# Patient Record
Sex: FEMALE | Race: WHITE | URBAN - METROPOLITAN AREA
[De-identification: names, ages, dates, MRNs, and addresses within clinical notes are randomized per-mention and may not be internally consistent; named-entity substitution may affect disease eponyms.]

---

## 2023-06-02 LAB
C. TRACHOMATIS, EXTERNAL RESULT: NEGATIVE
N. GONORRHOEAE, EXTERNAL RESULT: NEGATIVE

## 2023-06-28 LAB
ABO, EXTERNAL RESULT: NORMAL
HEP B, EXTERNAL RESULT: NEGATIVE
HEPATITIS C ANTIBODY, EXTERNAL RESULT: NEGATIVE
HIV, EXTERNAL RESULT: NEGATIVE
RH FACTOR, EXTERNAL RESULT: POSITIVE
RUBELLA TITER, EXTERNAL RESULT: NORMAL

## 2024-01-12 LAB — GBS, EXTERNAL RESULT: NEGATIVE

## 2024-02-04 ENCOUNTER — HOSPITAL ENCOUNTER (INPATIENT)
Age: 29
LOS: 2 days | Discharge: HOME | End: 2024-02-07
Attending: OBSTETRICS & GYNECOLOGY | Admitting: OBSTETRICS & GYNECOLOGY
Payer: COMMERCIAL

## 2024-02-05 ENCOUNTER — ANESTHESIA (OUTPATIENT)
Dept: LABOR AND DELIVERY | Age: 29
End: 2024-02-05
Payer: COMMERCIAL

## 2024-02-05 ENCOUNTER — ANESTHESIA EVENT (OUTPATIENT)
Dept: LABOR AND DELIVERY | Age: 29
End: 2024-02-05
Payer: COMMERCIAL

## 2024-02-05 PROBLEM — Z37.9 NORMAL LABOR: Status: ACTIVE | Noted: 2024-02-05

## 2024-02-05 LAB
ABO + RH BLD: NORMAL
AMPHETAMINES UR QL SCN>1000 NG/ML: NORMAL
BARBITURATES UR QL SCN>200 NG/ML: NORMAL
BENZODIAZ UR QL SCN>200 NG/ML: NORMAL
BLD GP AB SCN SERPL QL: NORMAL
BUPRENORPHINE+NOR UR QL SCN: NORMAL
CANNABINOIDS UR QL SCN>50 NG/ML: NORMAL
COCAINE UR QL SCN: NORMAL
DEPRECATED RDW RBC AUTO: 13.4 % (ref 12.4–15.4)
DRUG SCREEN COMMENT UR-IMP: NORMAL
FENTANYL SCREEN, URINE: NORMAL
HCT VFR BLD AUTO: 42.3 % (ref 36–48)
HGB BLD-MCNC: 14.3 G/DL (ref 12–16)
MCH RBC QN AUTO: 30 PG (ref 26–34)
MCHC RBC AUTO-ENTMCNC: 33.8 G/DL (ref 31–36)
MCV RBC AUTO: 88.7 FL (ref 80–100)
METHADONE UR QL SCN>300 NG/ML: NORMAL
OPIATES UR QL SCN>300 NG/ML: NORMAL
OXYCODONE UR QL SCN: NORMAL
PCP UR QL SCN>25 NG/ML: NORMAL
PH UR STRIP: 5 [PH]
PLATELET # BLD AUTO: 212 K/UL (ref 135–450)
PMV BLD AUTO: 9.9 FL (ref 5–10.5)
RBC # BLD AUTO: 4.77 M/UL (ref 4–5.2)
REAGIN+T PALLIDUM IGG+IGM SERPL-IMP: NORMAL
WBC # BLD AUTO: 14.2 K/UL (ref 4–11)

## 2024-02-05 PROCEDURE — 7200000001 HC VAGINAL DELIVERY

## 2024-02-05 PROCEDURE — 10907ZC DRAINAGE OF AMNIOTIC FLUID, THERAPEUTIC FROM PRODUCTS OF CONCEPTION, VIA NATURAL OR ARTIFICIAL OPENING: ICD-10-PCS | Performed by: OBSTETRICS & GYNECOLOGY

## 2024-02-05 PROCEDURE — 80307 DRUG TEST PRSMV CHEM ANLYZR: CPT

## 2024-02-05 PROCEDURE — 85027 COMPLETE CBC AUTOMATED: CPT

## 2024-02-05 PROCEDURE — 51702 INSERT TEMP BLADDER CATH: CPT

## 2024-02-05 PROCEDURE — 1220000000 HC SEMI PRIVATE OB R&B

## 2024-02-05 PROCEDURE — 6360000002 HC RX W HCPCS: Performed by: ADVANCED PRACTICE MIDWIFE

## 2024-02-05 PROCEDURE — 2580000003 HC RX 258

## 2024-02-05 PROCEDURE — 86900 BLOOD TYPING SEROLOGIC ABO: CPT

## 2024-02-05 PROCEDURE — 3700000025 EPIDURAL BLOCK: Performed by: ANESTHESIOLOGY

## 2024-02-05 PROCEDURE — 6360000002 HC RX W HCPCS: Performed by: REGISTERED NURSE

## 2024-02-05 PROCEDURE — 6370000000 HC RX 637 (ALT 250 FOR IP)

## 2024-02-05 PROCEDURE — 86850 RBC ANTIBODY SCREEN: CPT

## 2024-02-05 PROCEDURE — 2580000003 HC RX 258: Performed by: ADVANCED PRACTICE MIDWIFE

## 2024-02-05 PROCEDURE — 86901 BLOOD TYPING SEROLOGIC RH(D): CPT

## 2024-02-05 PROCEDURE — 86780 TREPONEMA PALLIDUM: CPT

## 2024-02-05 PROCEDURE — 2500000003 HC RX 250 WO HCPCS: Performed by: REGISTERED NURSE

## 2024-02-05 RX ORDER — CLINDAMYCIN PHOSPHATE 900 MG/50ML
900 INJECTION, SOLUTION INTRAVENOUS EVERY 8 HOURS
Status: DISCONTINUED | OUTPATIENT
Start: 2024-02-05 | End: 2024-02-05

## 2024-02-05 RX ORDER — FAMOTIDINE 10 MG/ML
20 INJECTION, SOLUTION INTRAVENOUS 2 TIMES DAILY
Status: DISCONTINUED | OUTPATIENT
Start: 2024-02-05 | End: 2024-02-05

## 2024-02-05 RX ORDER — LIDOCAINE HYDROCHLORIDE AND EPINEPHRINE BITARTRATE 20; .01 MG/ML; MG/ML
INJECTION, SOLUTION SUBCUTANEOUS PRN
Status: DISCONTINUED | OUTPATIENT
Start: 2024-02-05 | End: 2024-02-05 | Stop reason: SDUPTHER

## 2024-02-05 RX ORDER — SODIUM CHLORIDE 9 MG/ML
INJECTION, SOLUTION INTRAVENOUS PRN
Status: DISCONTINUED | OUTPATIENT
Start: 2024-02-05 | End: 2024-02-07 | Stop reason: HOSPADM

## 2024-02-05 RX ORDER — BUPIVACAINE HYDROCHLORIDE 5 MG/ML
INJECTION, SOLUTION EPIDURAL; INTRACAUDAL PRN
Status: DISCONTINUED | OUTPATIENT
Start: 2024-02-05 | End: 2024-02-05 | Stop reason: SDUPTHER

## 2024-02-05 RX ORDER — SODIUM CHLORIDE 0.9 % (FLUSH) 0.9 %
5-40 SYRINGE (ML) INJECTION PRN
Status: DISCONTINUED | OUTPATIENT
Start: 2024-02-05 | End: 2024-02-05

## 2024-02-05 RX ORDER — FERROUS SULFATE 325(65) MG
325 TABLET ORAL EVERY OTHER DAY
Status: DISCONTINUED | OUTPATIENT
Start: 2024-02-05 | End: 2024-02-07 | Stop reason: HOSPADM

## 2024-02-05 RX ORDER — SODIUM CHLORIDE, SODIUM LACTATE, POTASSIUM CHLORIDE, AND CALCIUM CHLORIDE .6; .31; .03; .02 G/100ML; G/100ML; G/100ML; G/100ML
500 INJECTION, SOLUTION INTRAVENOUS PRN
Status: DISCONTINUED | OUTPATIENT
Start: 2024-02-05 | End: 2024-02-05

## 2024-02-05 RX ORDER — MISOPROSTOL 100 UG/1
800 TABLET ORAL PRN
Status: DISCONTINUED | OUTPATIENT
Start: 2024-02-05 | End: 2024-02-05

## 2024-02-05 RX ORDER — SODIUM CHLORIDE, SODIUM LACTATE, POTASSIUM CHLORIDE, CALCIUM CHLORIDE 600; 310; 30; 20 MG/100ML; MG/100ML; MG/100ML; MG/100ML
INJECTION, SOLUTION INTRAVENOUS CONTINUOUS
Status: DISCONTINUED | OUTPATIENT
Start: 2024-02-05 | End: 2024-02-05

## 2024-02-05 RX ORDER — DOCUSATE SODIUM 100 MG/1
100 CAPSULE, LIQUID FILLED ORAL 2 TIMES DAILY
Status: DISCONTINUED | OUTPATIENT
Start: 2024-02-05 | End: 2024-02-07 | Stop reason: HOSPADM

## 2024-02-05 RX ORDER — CARBOPROST TROMETHAMINE 250 UG/ML
250 INJECTION, SOLUTION INTRAMUSCULAR PRN
Status: DISCONTINUED | OUTPATIENT
Start: 2024-02-05 | End: 2024-02-05

## 2024-02-05 RX ORDER — SODIUM CHLORIDE 0.9 % (FLUSH) 0.9 %
5-40 SYRINGE (ML) INJECTION EVERY 12 HOURS SCHEDULED
Status: DISCONTINUED | OUTPATIENT
Start: 2024-02-05 | End: 2024-02-07 | Stop reason: HOSPADM

## 2024-02-05 RX ORDER — LANOLIN 100 %
OINTMENT (GRAM) TOPICAL PRN
Status: DISCONTINUED | OUTPATIENT
Start: 2024-02-05 | End: 2024-02-07 | Stop reason: HOSPADM

## 2024-02-05 RX ORDER — SODIUM CHLORIDE 0.9 % (FLUSH) 0.9 %
5-40 SYRINGE (ML) INJECTION EVERY 12 HOURS SCHEDULED
Status: DISCONTINUED | OUTPATIENT
Start: 2024-02-05 | End: 2024-02-05

## 2024-02-05 RX ORDER — ONDANSETRON 2 MG/ML
4 INJECTION INTRAMUSCULAR; INTRAVENOUS EVERY 6 HOURS PRN
Status: DISCONTINUED | OUTPATIENT
Start: 2024-02-05 | End: 2024-02-05

## 2024-02-05 RX ORDER — NALBUPHINE HYDROCHLORIDE 20 MG/ML
10 INJECTION, SOLUTION INTRAMUSCULAR; INTRAVENOUS; SUBCUTANEOUS
Status: DISCONTINUED | OUTPATIENT
Start: 2024-02-05 | End: 2024-02-05

## 2024-02-05 RX ORDER — ACETAMINOPHEN 500 MG
1000 TABLET ORAL EVERY 8 HOURS PRN
Status: DISCONTINUED | OUTPATIENT
Start: 2024-02-05 | End: 2024-02-07 | Stop reason: HOSPADM

## 2024-02-05 RX ORDER — SODIUM CHLORIDE 9 MG/ML
25 INJECTION, SOLUTION INTRAVENOUS PRN
Status: DISCONTINUED | OUTPATIENT
Start: 2024-02-05 | End: 2024-02-05

## 2024-02-05 RX ORDER — TERBUTALINE SULFATE 1 MG/ML
0.25 INJECTION, SOLUTION SUBCUTANEOUS
Status: DISCONTINUED | OUTPATIENT
Start: 2024-02-05 | End: 2024-02-05

## 2024-02-05 RX ORDER — METHYLERGONOVINE MALEATE 0.2 MG/ML
200 INJECTION INTRAVENOUS PRN
Status: DISCONTINUED | OUTPATIENT
Start: 2024-02-05 | End: 2024-02-05

## 2024-02-05 RX ORDER — IBUPROFEN 800 MG/1
800 TABLET ORAL EVERY 8 HOURS PRN
Status: DISCONTINUED | OUTPATIENT
Start: 2024-02-05 | End: 2024-02-07 | Stop reason: HOSPADM

## 2024-02-05 RX ORDER — DOCUSATE SODIUM 100 MG/1
100 CAPSULE, LIQUID FILLED ORAL 2 TIMES DAILY
Status: DISCONTINUED | OUTPATIENT
Start: 2024-02-05 | End: 2024-02-05

## 2024-02-05 RX ORDER — SODIUM CHLORIDE 0.9 % (FLUSH) 0.9 %
5-40 SYRINGE (ML) INJECTION PRN
Status: DISCONTINUED | OUTPATIENT
Start: 2024-02-05 | End: 2024-02-07 | Stop reason: HOSPADM

## 2024-02-05 RX ORDER — SODIUM CHLORIDE, SODIUM LACTATE, POTASSIUM CHLORIDE, AND CALCIUM CHLORIDE .6; .31; .03; .02 G/100ML; G/100ML; G/100ML; G/100ML
1000 INJECTION, SOLUTION INTRAVENOUS PRN
Status: DISCONTINUED | OUTPATIENT
Start: 2024-02-05 | End: 2024-02-05

## 2024-02-05 RX ADMIN — Medication 87.3 MILLI-UNITS/MIN: at 13:03

## 2024-02-05 RX ADMIN — Medication 1 MILLI-UNITS/MIN: at 05:02

## 2024-02-05 RX ADMIN — Medication 10 ML: at 22:11

## 2024-02-05 RX ADMIN — Medication 15 ML/HR: at 05:56

## 2024-02-05 RX ADMIN — SODIUM CHLORIDE, POTASSIUM CHLORIDE, SODIUM LACTATE AND CALCIUM CHLORIDE: 600; 310; 30; 20 INJECTION, SOLUTION INTRAVENOUS at 06:01

## 2024-02-05 RX ADMIN — IBUPROFEN 800 MG: 800 TABLET, FILM COATED ORAL at 22:35

## 2024-02-05 RX ADMIN — LIDOCAINE HYDROCHLORIDE AND EPINEPHRINE 5 ML: 20; 10 INJECTION, SOLUTION INFILTRATION; PERINEURAL at 05:46

## 2024-02-05 RX ADMIN — SODIUM CHLORIDE, POTASSIUM CHLORIDE, SODIUM LACTATE AND CALCIUM CHLORIDE 1000 ML: 600; 310; 30; 20 INJECTION, SOLUTION INTRAVENOUS at 05:04

## 2024-02-05 RX ADMIN — SODIUM CHLORIDE, POTASSIUM CHLORIDE, SODIUM LACTATE AND CALCIUM CHLORIDE: 600; 310; 30; 20 INJECTION, SOLUTION INTRAVENOUS at 08:16

## 2024-02-05 RX ADMIN — BUPIVACAINE HYDROCHLORIDE 5 ML: 5 INJECTION, SOLUTION EPIDURAL; INTRACAUDAL; PERINEURAL at 05:50

## 2024-02-05 RX ADMIN — IBUPROFEN 800 MG: 800 TABLET, FILM COATED ORAL at 14:57

## 2024-02-05 RX ADMIN — BENZOCAINE AND LEVOMENTHOL: 200; 5 SPRAY TOPICAL at 14:57

## 2024-02-05 RX ADMIN — BUPIVACAINE HYDROCHLORIDE 3 ML: 5 INJECTION, SOLUTION EPIDURAL; INTRACAUDAL; PERINEURAL at 05:54

## 2024-02-05 RX ADMIN — Medication 166.7 ML: at 12:44

## 2024-02-05 RX ADMIN — DOCUSATE SODIUM 100 MG: 100 CAPSULE, LIQUID FILLED ORAL at 22:10

## 2024-02-05 RX ADMIN — ACETAMINOPHEN 1000 MG: 500 TABLET ORAL at 18:19

## 2024-02-05 RX ADMIN — NALBUPHINE HYDROCHLORIDE 10 MG: 20 INJECTION, SOLUTION INTRAMUSCULAR; INTRAVENOUS; SUBCUTANEOUS at 01:24

## 2024-02-05 RX ADMIN — WITCH HAZEL: 500 SOLUTION RECTAL; TOPICAL at 14:57

## 2024-02-05 ASSESSMENT — PAIN DESCRIPTION - LOCATION
LOCATION: ABDOMEN
LOCATION: BACK
LOCATION: BACK

## 2024-02-05 ASSESSMENT — PAIN DESCRIPTION - DESCRIPTORS
DESCRIPTORS: ACHING
DESCRIPTORS: ACHING;DISCOMFORT
DESCRIPTORS: CRAMPING

## 2024-02-05 ASSESSMENT — PAIN - FUNCTIONAL ASSESSMENT
PAIN_FUNCTIONAL_ASSESSMENT: ACTIVITIES ARE NOT PREVENTED
PAIN_FUNCTIONAL_ASSESSMENT: ACTIVITIES ARE NOT PREVENTED

## 2024-02-05 ASSESSMENT — PAIN SCALES - GENERAL
PAINLEVEL_OUTOF10: 3
PAINLEVEL_OUTOF10: 2
PAINLEVEL_OUTOF10: 3
PAINLEVEL_OUTOF10: 5

## 2024-02-05 ASSESSMENT — PAIN DESCRIPTION - ORIENTATION: ORIENTATION: LOWER;MID

## 2024-02-05 NOTE — ANESTHESIA PRE PROCEDURE
Department of Anesthesiology  Preprocedure Note       Name:  Roberta Santos   Age:  28 y.o.  :  1995                                          MRN:  [id number]         Date:  2024      Surgeon: * No surgeons listed *    Procedure: * No procedures listed *    Medications prior to admission:   Prior to Admission medications    Medication Sig Start Date End Date Taking? Authorizing Provider   Prenatal MV-Min-Fe Fum-FA-DHA (PRENATAL 1 PO) Take by mouth   Yes Provider, MD Ludivina       Current medications:    Current Facility-Administered Medications   Medication Dose Route Frequency Provider Last Rate Last Admin    lactated ringers IV soln infusion   IntraVENous Continuous Pham No APRN - VIVI        lactated ringers bolus bolus 500 mL  500 mL IntraVENous PRN Pham No APRN - CNM        Or    lactated ringers bolus bolus 1,000 mL  1,000 mL IntraVENous PRN Pham No APRN - VIVI 999 mL/hr at 24 0504 1,000 mL at 24 0504    sodium chloride flush 0.9 % injection 5-40 mL  5-40 mL IntraVENous 2 times per day Pham No APRN - VIVI        sodium chloride flush 0.9 % injection 5-40 mL  5-40 mL IntraVENous PRN Pham No APRN - CNDELISA        0.9 % sodium chloride infusion  25 mL IntraVENous PRN Pham No APRN - CNDELISA        methylergonovine (METHERGINE) injection 200 mcg  200 mcg IntraMUSCular PRN Pham No APRN - VIVI        carboprost (HEMABATE) injection 250 mcg  250 mcg IntraMUSCular PRN Pham No APRN - CNDELISA        miSOPROStol (CYTOTEC) tablet 800 mcg  800 mcg Rectal PRN Pham No APRN - VIVI        tranexamic acid (CYKLOKAPRON) 1,000 mg in sodium chloride 0.9 % 100 mL IVPB  1,000 mg IntraVENous Once PRN Pham No APRN - VIVI        oxytocin (PITOCIN) 30 units in 500 mL infusion  87.3 maximiliano-units/min IntraVENous Continuous PRN Pham No APRN - CNM        And    oxytocin

## 2024-02-05 NOTE — H&P
Department of Obstetrics and Gynecology   Obstetrics History and Physical        CHIEF COMPLAINT:  contractions    HISTORY OF PRESENT ILLNESS:      The patient is a 28 y.o. female at 40w3d.  OB History          1    Para   0    Term   0       0    AB   0    Living   0         SAB   0    IAB   0    Ectopic   0    Molar   0    Multiple   0    Live Births   0            Patient presents with a chief complaint as above and is being admitted for latent labor. Was FT/20/-3 in office on 24 and is now 4/70/-2. Started having contractions last night that she was able to sleep through and they have gotten progressively worse this evening. Denies decreased fetal movement, LOF, sekou red bleeding. Desires epidural and plans to breastfeed.    Estimated Due Date: Estimated Date of Delivery: 24    PRENATAL CARE: normal level 1 sono    Complicated by: none    PAST OB HISTORY:  OB History          1    Para   0    Term   0       0    AB   0    Living   0         SAB   0    IAB   0    Ectopic   0    Molar   0    Multiple   0    Live Births   0                Past Medical History:    History reviewed. No pertinent past medical history.  Past Surgical History:        Procedure Laterality Date    WISDOM TOOTH EXTRACTION       Allergies:  Penicillins    Social History:    Social History     Socioeconomic History    Marital status:      Spouse name: Not on file    Number of children: Not on file    Years of education: Not on file    Highest education level: Not on file   Occupational History    Not on file   Tobacco Use    Smoking status: Never    Smokeless tobacco: Never   Vaping Use    Vaping Use: Never used   Substance and Sexual Activity    Alcohol use: Never    Drug use: Never    Sexual activity: Yes     Partners: Male   Other Topics Concern    Not on file   Social History Narrative    Not on file     Social Determinants of Health     Financial Resource Strain: Not on file   Food Insecurity:

## 2024-02-05 NOTE — PROCEDURES
Department of Obstetrics and Gynecology  Spontaneous Vaginal Delivery Note    Labor & Delivery Summary  Labor Onset Date: 24  Labor Onset Time: 09  Dilation Complete Date: 24  Dilation Complete Time: 1115    Pre-operative Diagnosis:  Term pregnancy, Spontaneous labor, Single fetus, and Uncomplicated pregnancy    Post-operative Diagnosis:  Living  infant(s) and Male    Procedure:  Spontaneous vaginal delivery and midline episiotomy and repair    Provider:   DALE Fitzgerald CNM    Information for the patient's :  Agustín Santos [[id number]]        Anesthesia:  epidural anesthesia    Estimated blood loss:  250 ml    Specimen:  Placenta not sent to pathology     Cord blood sent No    Complications:  none    Condition:  infant stable and mother stable    Details of Procedure:   The patient is a 28 y.o. female at 40w3d   OB History          1    Para   0    Term   0       0    AB   0    Living   0         SAB   0    IAB   0    Ectopic   0    Molar   0    Multiple   0    Live Births   0            who was admitted for latent labor. She received the following interventions: ARBOW and IV Pitocin augmentation. She was known to be GBS negative and did not receive antibiotic prophylaxis. The patient progressed well, did receive an epidural, became complete and started to push. After pushing for 13 minutes, the fetal head was at the perineum, a midline episiotomy was done for fetal bradycardia despite position changes and lack of adequate maternal pushing effort. The rest of the infant delivered atraumatically and was placed on the mother's abdomen. The cord was clamped and cut and infant was evaluated by the SCN on the mother's abdomen. The infant was placed skin to skin with the mother. The delivery of the placenta was spontaneous. The perineum and vagina were explored. A midline second degree episiotomy was repaired in standard fashion w/ a 3.0 vicryl. Apgars were 8 and 9 at 1 and 5

## 2024-02-05 NOTE — ANESTHESIA PROCEDURE NOTES
Epidural Block    Patient location during procedure: OB  Start time: 2/5/2024 5:43 AM  End time: 2/5/2024 5:48 AM  Reason for block: labor epidural  Staffing  Performed: resident/CRNA   Resident/CRNA: Valencia Muñoz APRN - CRNA  Performed by: Valencia Muñoz APRN - CRNA  Authorized by: Johnny Scherer MD    Epidural  Patient position: sitting  Prep: ChloraPrep  Patient monitoring: continuous pulse ox and frequent blood pressure checks  Approach: midline  Location: L4-5  Injection technique: DWAYNE saline  Provider prep: mask and sterile gloves  Needle  Needle type: Tuohy   Needle gauge: 17 G  Needle length: 3.5 in  Needle insertion depth: 5 cm  Catheter type: multi-orifice  Catheter at skin depth: 9 cm  Test dose: negativeCatheter Secured: tegaderm and tape  Assessment  Sensory level: T6  Hemodynamics: stable  Attempts: 1  Outcomes: uncomplicated and patient tolerated procedure well  Preanesthetic Checklist  Completed: patient identified, IV checked, site marked, risks and benefits discussed, surgical/procedural consents, equipment checked, pre-op evaluation, timeout performed, anesthesia consent given, oxygen available, monitors applied/VS acknowledged, fire risk safety assessment completed and verbalized and blood product R/B/A discussed and consented

## 2024-02-06 PROBLEM — Z37.9 NORMAL LABOR: Status: RESOLVED | Noted: 2024-02-05 | Resolved: 2024-02-06

## 2024-02-06 PROCEDURE — 1220000000 HC SEMI PRIVATE OB R&B

## 2024-02-06 PROCEDURE — 6370000000 HC RX 637 (ALT 250 FOR IP)

## 2024-02-06 RX ADMIN — ACETAMINOPHEN 1000 MG: 500 TABLET ORAL at 18:48

## 2024-02-06 RX ADMIN — IBUPROFEN 800 MG: 800 TABLET, FILM COATED ORAL at 06:40

## 2024-02-06 RX ADMIN — IBUPROFEN 800 MG: 800 TABLET, FILM COATED ORAL at 23:03

## 2024-02-06 RX ADMIN — ACETAMINOPHEN 1000 MG: 500 TABLET ORAL at 02:06

## 2024-02-06 RX ADMIN — ACETAMINOPHEN 1000 MG: 500 TABLET ORAL at 10:29

## 2024-02-06 RX ADMIN — DOCUSATE SODIUM 100 MG: 100 CAPSULE, LIQUID FILLED ORAL at 10:30

## 2024-02-06 RX ADMIN — IBUPROFEN 800 MG: 800 TABLET, FILM COATED ORAL at 14:34

## 2024-02-06 ASSESSMENT — PAIN SCALES - GENERAL
PAINLEVEL_OUTOF10: 3
PAINLEVEL_OUTOF10: 3
PAINLEVEL_OUTOF10: 2
PAINLEVEL_OUTOF10: 2
PAINLEVEL_OUTOF10: 3
PAINLEVEL_OUTOF10: 2

## 2024-02-06 ASSESSMENT — PAIN DESCRIPTION - LOCATION
LOCATION: BACK
LOCATION: ABDOMEN;BUTTOCKS
LOCATION: ABDOMEN
LOCATION: ABDOMEN;PERINEUM
LOCATION: ABDOMEN;PERINEUM

## 2024-02-06 ASSESSMENT — PAIN - FUNCTIONAL ASSESSMENT
PAIN_FUNCTIONAL_ASSESSMENT: ACTIVITIES ARE NOT PREVENTED

## 2024-02-06 ASSESSMENT — PAIN DESCRIPTION - DESCRIPTORS
DESCRIPTORS: ACHING;CRAMPING
DESCRIPTORS: ACHING
DESCRIPTORS: ACHING;CRAMPING
DESCRIPTORS: SORE
DESCRIPTORS: ACHING

## 2024-02-06 ASSESSMENT — PAIN DESCRIPTION - ORIENTATION: ORIENTATION: LOWER

## 2024-02-07 VITALS
BODY MASS INDEX: 31.01 KG/M2 | DIASTOLIC BLOOD PRESSURE: 58 MMHG | OXYGEN SATURATION: 99 % | RESPIRATION RATE: 16 BRPM | SYSTOLIC BLOOD PRESSURE: 118 MMHG | HEIGHT: 63 IN | TEMPERATURE: 97.7 F | WEIGHT: 175 LBS | HEART RATE: 70 BPM

## 2024-02-07 PROCEDURE — 6370000000 HC RX 637 (ALT 250 FOR IP)

## 2024-02-07 RX ORDER — LANOLIN 100 %
OINTMENT (GRAM) TOPICAL
Refills: 3 | COMMUNITY
Start: 2024-02-07

## 2024-02-07 RX ORDER — IBUPROFEN 800 MG/1
800 TABLET ORAL EVERY 8 HOURS PRN
Qty: 30 TABLET | Refills: 3 | Status: SHIPPED | OUTPATIENT
Start: 2024-02-07

## 2024-02-07 RX ORDER — PSEUDOEPHEDRINE HCL 30 MG
100 TABLET ORAL 2 TIMES DAILY
COMMUNITY
Start: 2024-02-07

## 2024-02-07 RX ADMIN — IBUPROFEN 800 MG: 800 TABLET, FILM COATED ORAL at 06:49

## 2024-02-07 RX ADMIN — ACETAMINOPHEN 1000 MG: 500 TABLET ORAL at 04:05

## 2024-02-07 ASSESSMENT — PAIN SCALES - GENERAL
PAINLEVEL_OUTOF10: 2
PAINLEVEL_OUTOF10: 1

## 2024-02-07 NOTE — ANESTHESIA POSTPROCEDURE EVALUATION
Department of Anesthesiology  Postprocedure Note    Patient: Roberta Santos  MRN: [id number]  YOB: 1995  Date of evaluation: 2/6/2024    Procedure Summary       Date: 02/05/24 Room / Location:     Anesthesia Start: 0535 Anesthesia Stop: 1237    Procedure: Labor Analgesia Diagnosis:     Scheduled Providers:  Responsible Provider: Johnny Scherer MD    Anesthesia Type: epidural ASA Status: 2 - Emergent            Anesthesia Type: No value filed.    Jackie Phase I: Jackie Score: 10    Jackie Phase II: Jackie Score: 10    Anesthesia Post Evaluation    Cardiovascular status: hemodynamically stable  Hydration status: stable  Comments: Flowsheet and notes reviewed. Pt. Up in shower when attempted to visit earlier. No apparent anesthesia related complications.  Pain management: adequate and satisfactory to patient        No notable events documented.

## 2024-02-07 NOTE — LACTATION NOTE
This note was copied from a baby's chart.  Lactation Progress Note      Data:    F/U consult for primip on DOD w/ an infant born @ 40.3 weeks gestation. MOB reports first feed went ok but infant has been kind of sleepy since. At time of consult MOB was attempting to latch sleepy baby.          Action:    Introduced self & ensured name & lactation # is on whiteboard in room. Educated mother about cross cradle & football positions, how to support infants head, how to support the breast, and steps for a EDMOND. Suggested I guide her hands, MOB agreeable. Positioned infant in football position at left breast. Infant achieved a EDMOND but was on & off the breast.     Breasts noted to be asymmetric with large bulbous nipples. MOB states that her breasts did not experience any changes during pregnancy and not much changed during during adolescence. Possible insufficient glandular tissue. Educated mother about this possibility and suggested she monitor infant output until after mature milk comes in and she has seen pediatrician to check baby's weight. Educated mother about the signs to watch for & when mature milk should transition in and how to tell infant is getting enough at the breast. MOB also states she has not had any cramping when BF so far.     Woke infant and assisted mother position at left breast in football. Infant on & off. Suggested mother hand express drops of colostrum to entice, educated & demonstrated for mother how to do so. MOB able to express several drops. After a few attempts, infant had a stooled diaper. Changed diaper and took back to mothers left breast. Infant latched but was on & off w/ only suck bursts, then after a few minutes, baby began some SRS. Remained at the breast after consult ended.     Reviewed breastfeeding education, how the breasts work to make milk & protecting milk supply, what to expect w/ cluster feeding, when to expect mature milk, & infant feeding cues. Encouraged mother to allow 
This note was copied from a baby's chart.  Lactation Progress Note      Data:    F/U consult for primip on day 1 pp w/ an infant born @ 40.3 weeks gestation. At time of consult infant was latched deeply at left  breast in football position, SRS noted. MOB reports infant has been latching and feeding well but she is a little tender but not sore. LC concern for potential insufficient glandular tissue with appearance of breast/nipple. No significant medical history per MOB.         Action:    Introduced self & ensured name & lactation # is on whiteboard in room. Much encouragement given for great positioning and deep latch. Reviewed breastfeeding education & infant feeding cues. Encouraged mother to allow infant to breast feed on demand anytime feeding cues are shown and if no feeding cues are shown to attempt to wake infant to feed every 2-3 hours with a minimum of 8-12 feeds a day per 24 hour period.     Reviewed what to expect with cluster feeding, how the breasts work to make milk, soothing tender nipples, and protecting milk supply. Reviewed importance of monitoring infant output & weight trends, watch for signs of mature milk coming in, and what to do if these goals are not met. All questions answered. Mother encouraged to call lactation for F/U care as needed.     Response:    MOB verbalized an understanding of education provided and will call for assistance as needed.   
This note was copied from a baby's chart.  Lactation Progress Note      Data:   F/U with primip 1PP with breastfeeding and lactation rounds. Infant born at 40w 3 days gestation by .  MOB states that infant continues latching well to both breast. Nipples are intact with no soreness noted at this time. Good output established. MOB with some general breastfeeding questions, and return to work with pumping expectations.    LC concern for potential insufficient glandular tissue with appearance of breast/nipple. No significant medical history per mob.    Feeding Method: Feeding Method Used: Breastfeeding.    Urine output:  x 1 established   Stool output:  x 5 established  Percent weight change from birth:  -1%    Action: Introduced self to patient as Lactation RN, name and phone number written on white board in room.     Reviewed with family what to expect over the next  24-48 hours with infant feedings, infant output, and breast care. Reviewed infant feeding cues and encouraged mother to allow infant to breast feed on demand, a minimum of 8-12 times a day after the first day of life.     BF education reviewed with patient and what to expect over then next 1-2 weeks with mature milk production, infant feedings, infant output, breast care, engorgement prevention, pacifier, bottle use, and pumping information.     Encouraged follow up to check infant latch today, or with further questions or concerns.     Binder and breast feeding log reviewed, all questions answered. Mother instructed to call Lactation nurse for F/U care as needed.    Response: MOB verbalizes understanding of bf education that was provided. Comfortable with breastfeeding at this time. Will f/u as needed for care.  
This note was copied from a baby's chart.  Lactation Progress Note      Data:  F/u during lactation rounds with primip breast feeder, 2 pp who delivered by  at 40.3 weeks gestation. Mother reports confident with latching and breastfeeding, stating that baby is latching comfortably and breastfeeding well and baby cluster fed overnight with good output. Infant at 6% weight loss.     Action:  Introduced self as LC on for the day and offered support. Reviewed importance of obtaining a good deep comfortable latch with feedings and gave tips to achieve. Educated on how a good latch should look and feel, and how to break the suction of the latch and relatch more deeply if the latch is ever shallow, or causing discomfort. Explained that nipple should be rounded when baby releases from the breast, without creasing or compression. Discharge breast feeding education provided referring to breastfeeding guide booklet including breast care, how milk production works, prevention and treatment of engorgement, what to watch for and how to seek f/u if s/s of mastitis were to arise, educated on what to expect with  feeding behaviors and growth spurts, reassuring of cluster feeding behaviors and educating on the important role they play on milk supply, and what to look for as reassurance of how to know baby is getting enough at the breast including daily goals and expectations for infant feedings, output, and expected weight trends. Encouraged to continue to offer the breast ad richa when infant first begins to wake and show early hunger cues, and every 2-3 hours if baby is sleepy and without feeding cues. Instructed that baby should have a minimum of 8-12 good feedings in a 24 period after the first DOL. Encouraged to wait until latching and milk supply are well established before introducing pumping, pacifiers, and bottles of EBM. Discussed that a good time to introduce them and begin preparing for return to work is usually 
care as needed.    Response: MOB is pleased with infant's first ongoing feeding. Comfortable with position and latch at this time. Will call for f/u care as needed during stay for lactation support.

## 2024-02-07 NOTE — PLAN OF CARE
Problem: Pain  Goal: Verbalizes/displays adequate comfort level or baseline comfort level  2024 by Caterina Driver RN  Outcome: Progressing  2024 1315 by Natasha Sotelo RN  Outcome: Progressing  2024 07 by Natasha Sotelo RN  Outcome: Not Progressing  2024 0620 by Leslie Weeks RN  Outcome: Progressing     Problem: Postpartum  Goal: Experiences normal postpartum course  Description:  Postpartum OB-Pregnancy care plan goal which identifies if the mother is experiencing a normal postpartum course  2024 by Caterina Driver RN  Outcome: Progressing  2024 1315 by Natasha Sotelo RN  Outcome: Progressing  2024 by Natasha Sotelo RN  Outcome: Not Progressing  Goal: Appropriate maternal -  bonding  Description:  Postpartum OB-Pregnancy care plan goal which identifies if the mother and  are bonding appropriately  2024 by Caterina Driver RN  Outcome: Progressing  2024 by Natasha Sotelo RN  Outcome: Not Progressing  Goal: Establishment of infant feeding pattern  Description:  Postpartum OB-Pregnancy care plan goal which identifies if the mother is establishing a feeding pattern with their   2024 by Caterina Driver RN  Outcome: Progressing  2024 07 by Natasha Sotelo RN  Outcome: Not Progressing  Goal: Incisions, wounds, or drain sites healing without S/S of infection  2024 by Caterina Driver RN  Outcome: Progressing  2024 by Natasha Sotelo RN  Outcome: Not Progressing     Problem: Infection - Adult  Goal: Absence of infection at discharge  2024 by Caterina Driver RN  Outcome: Progressing  2024 0725 by Natasha Sotelo RN  Outcome: Not Progressing  2024 0620 by Leslie Weeks RN  Outcome: Progressing  Goal: Absence of infection during hospitalization  2024 by Caterina Driver RN  Outcome: Progressing  2024 0725 by Natasha Sotelo RN  Outcome: Not 
  Problem: Pain  Goal: Verbalizes/displays adequate comfort level or baseline comfort level  2024 by Chantelle Ruiz RN  Outcome: Progressing  2024 by Mendez Bone RN  Outcome: Progressing  2024 by Caterina Driver RN  Outcome: Progressing     Problem: Postpartum  Goal: Experiences normal postpartum course  Description:  Postpartum OB-Pregnancy care plan goal which identifies if the mother is experiencing a normal postpartum course  2024 by Chantelle Ruiz RN  Outcome: Progressing  2024 by Mendez Bone RN  Outcome: Progressing  2024 by Caterina rDiver RN  Outcome: Progressing  Goal: Appropriate maternal -  bonding  Description:  Postpartum OB-Pregnancy care plan goal which identifies if the mother and  are bonding appropriately  2024 by Chantelle Ruiz RN  Outcome: Progressing  2024 by Mendez Bone RN  Outcome: Progressing  2024 by Caterina Driver RN  Outcome: Progressing  Goal: Establishment of infant feeding pattern  Description:  Postpartum OB-Pregnancy care plan goal which identifies if the mother is establishing a feeding pattern with their   2024 by Chantelle Ruiz RN  Outcome: Progressing  2024 by Mendez Bone RN  Outcome: Progressing  2024 by Caterina Driver RN  Outcome: Progressing  Goal: Incisions, wounds, or drain sites healing without S/S of infection  2024 by Chantelle Ruiz RN  Outcome: Progressing  2024 by Mendez Bone RN  Outcome: Progressing  2024 by Caterina Driver RN  Outcome: Progressing     Problem: Postpartum  Goal: Appropriate maternal -  bonding  Description:  Postpartum OB-Pregnancy care plan goal which identifies if the mother and  are bonding appropriately  2024 by Chantelle Ruiz RN  Outcome: Progressing  2024 by Mendez Bone RN  Outcome: Progressing  2024 by Caterina Driver RN  Outcome: 
  Problem: Pain  Goal: Verbalizes/displays adequate comfort level or baseline comfort level  2024 by Mendez Bone RN  Outcome: Progressing  2024 by Caterina Driver RN  Outcome: Progressing     Problem: Postpartum  Goal: Experiences normal postpartum course  Description:  Postpartum OB-Pregnancy care plan goal which identifies if the mother is experiencing a normal postpartum course  2024 by Mendez Bone RN  Outcome: Progressing  2024 by Caterina Driver RN  Outcome: Progressing  Goal: Appropriate maternal -  bonding  Description:  Postpartum OB-Pregnancy care plan goal which identifies if the mother and  are bonding appropriately  2024 by Mendez Bone RN  Outcome: Progressing  2024 by Caterina Driver RN  Outcome: Progressing  Goal: Establishment of infant feeding pattern  Description:  Postpartum OB-Pregnancy care plan goal which identifies if the mother is establishing a feeding pattern with their   2024 by Mendez Bone RN  Outcome: Progressing  2024 by Caterina Driver RN  Outcome: Progressing  Goal: Incisions, wounds, or drain sites healing without S/S of infection  2024 by Mendez Bone RN  Outcome: Progressing  2024 by Caterina Driver RN  Outcome: Progressing     Problem: Infection - Adult  Goal: Absence of infection at discharge  2024 by Mendez Bone RN  Outcome: Progressing  2024 by Caterina Driver RN  Outcome: Progressing  Goal: Absence of infection during hospitalization  2024 by Mendez Bone RN  Outcome: Progressing  2024 by Caterina Driver RN  Outcome: Progressing  Goal: Absence of fever/infection during anticipated neutropenic period  2024 by Mendez Bone RN  Outcome: Progressing  2024 by Caterina Driver RN  Outcome: Progressing     Problem: Safety - Adult  Goal: Free from fall injury  2024 by Mendez Bone 
  Problem: Pain  Goal: Verbalizes/displays adequate comfort level or baseline comfort level  2024 by Natasha Sotelo RN  Outcome: Progressing  2024 by Natasha Sotelo RN  Outcome: Not Progressing  2024 06 by Leslie Weeks RN  Outcome: Progressing     Problem: Pain  Goal: Verbalizes/displays adequate comfort level or baseline comfort level  2024 by Natasha Sotelo RN  Outcome: Progressing  2024 by Natasha Sotelo RN  Outcome: Not Progressing  2024 06 by Leslie Weeks RN  Outcome: Progressing     Problem: Vaginal Birth or  Section  Goal: Fetal and maternal status remain reassuring during the birth process  Description:  Birth OB-Pregnancy care plan goal which identifies if the fetal and maternal status remain reassuring during the birth process  2024 by Natasha Sotelo RN  Outcome: Completed  2024 by Natasha Sotelo RN  Outcome: Not Progressing  2024 06 by Leslie Weeks RN  Outcome: Progressing     Problem: Postpartum  Goal: Experiences normal postpartum course  Description:  Postpartum OB-Pregnancy care plan goal which identifies if the mother is experiencing a normal postpartum course  2024 by Natasha Sotelo RN  Outcome: Progressing  2024 by Natasha Sotelo RN  Outcome: Not Progressing  Goal: Appropriate maternal -  bonding  Description:  Postpartum OB-Pregnancy care plan goal which identifies if the mother and  are bonding appropriately  Outcome: Not Progressing  Goal: Establishment of infant feeding pattern  Description:  Postpartum OB-Pregnancy care plan goal which identifies if the mother is establishing a feeding pattern with their   Outcome: Not Progressing  Goal: Incisions, wounds, or drain sites healing without S/S of infection  Outcome: Not Progressing     Problem: Infection - Adult  Goal: Absence of infection at discharge  2024 by Natasha Sotelo 
  Problem: Pain  Goal: Verbalizes/displays adequate comfort level or baseline comfort level  Outcome: Completed     Problem: Postpartum  Goal: Experiences normal postpartum course  Description:  Postpartum OB-Pregnancy care plan goal which identifies if the mother is experiencing a normal postpartum course  Outcome: Completed  Goal: Appropriate maternal -  bonding  Description:  Postpartum OB-Pregnancy care plan goal which identifies if the mother and  are bonding appropriately  Outcome: Completed  Goal: Establishment of infant feeding pattern  Description:  Postpartum OB-Pregnancy care plan goal which identifies if the mother is establishing a feeding pattern with their   Outcome: Completed  Goal: Incisions, wounds, or drain sites healing without S/S of infection  Outcome: Completed     Problem: Infection - Adult  Goal: Absence of infection at discharge  Outcome: Completed  Goal: Absence of infection during hospitalization  Outcome: Completed  Goal: Absence of fever/infection during anticipated neutropenic period  Outcome: Completed     Problem: Safety - Adult  Goal: Free from fall injury  Outcome: Completed     Problem: Discharge Planning  Goal: Discharge to home or other facility with appropriate resources  Outcome: Completed     Problem: Chronic Conditions and Co-morbidities  Goal: Patient's chronic conditions and co-morbidity symptoms are monitored and maintained or improved  Outcome: Completed     Problem: Skin/Tissue Integrity - Adult  Goal: Incisions, wounds, or drain sites healing without S/S of infection  Outcome: Completed  Goal: Skin integrity remains intact  Outcome: Completed     
  Problem: Pain  Goal: Verbalizes/displays adequate comfort level or baseline comfort level  Outcome: Progressing     Problem: Vaginal Birth or  Section  Goal: Fetal and maternal status remain reassuring during the birth process  Description:  Birth OB-Pregnancy care plan goal which identifies if the fetal and maternal status remain reassuring during the birth process  Outcome: Progressing     Problem: Infection - Adult  Goal: Absence of infection at discharge  Outcome: Progressing  Goal: Absence of infection during hospitalization  Outcome: Progressing  Goal: Absence of fever/infection during anticipated neutropenic period  Outcome: Progressing     Problem: Safety - Adult  Goal: Free from fall injury  Outcome: Progressing     Problem: Discharge Planning  Goal: Discharge to home or other facility with appropriate resources  Outcome: Progressing     Problem: Chronic Conditions and Co-morbidities  Goal: Patient's chronic conditions and co-morbidity symptoms are monitored and maintained or improved  Outcome: Progressing     
injury  2/5/2024 0725 by Natasha Sotelo RN  Outcome: Not Progressing  2/5/2024 0620 by Leslie Weeks RN  Outcome: Progressing     Problem: Discharge Planning  Goal: Discharge to home or other facility with appropriate resources  2/5/2024 0725 by Natasha Sotelo RN  Outcome: Not Progressing  2/5/2024 0620 by Leslie Weeks RN  Outcome: Progressing     Problem: Chronic Conditions and Co-morbidities  Goal: Patient's chronic conditions and co-morbidity symptoms are monitored and maintained or improved  2/5/2024 0725 by Natasha Sotelo RN  Outcome: Not Progressing  2/5/2024 0620 by Leslie Weeks RN  Outcome: Progressing

## 2024-02-07 NOTE — DISCHARGE INSTRUCTIONS
milk.  Avoid stimulation to your breasts. When showering, allow the water to strike only your back.  Wear a good fitting bra, such as a sports bra, until your milk dries up    OTHER REASONS TO CALL YOUR DOCTOR    Your abdomen is tender to the touch or you have pain that cannot be relieved.  Flu-like symptoms such as achy muscles and joints or you are experiencing extreme weakness or dizziness.  Persistent burning or increased urgency in urination.      LITERATURE PROVIDED    For Moms and Those Who Care About Them  Your 's Healthy Start, Grow Smart  Breastfeeding Best for Baby, Best for Mom.  [facility] Parent Information About Universal Hearing Screening  Controlling pain.    I have received the educational material listed above.  The  Channel has provided me with the opportunity to view instructional videos pertaining to infant care and the care of myself.    I verify that I have received the above information and have no further questions and feel confident to care for myself and my baby.    For more information about postpartum care, baby care and feeding, create a [facility] My Chart account, sign in and search using the magnifying glass, typing in postpartum, breast feeding or formula feeding.  [email or web address]

## 2024-02-07 NOTE — PROGRESS NOTES
DALE Fitzgerald CNM at pt bedside. FHR tracing and contraction pattern reviewed. SVE done pt is 6cm, 80% effaced and -1 station. Pt request to sit for awhile. Pt position changed with assistance from nurse and midwife. Plan of care reviewed with pt and family  
DALE Fitzgerald CNM at pt bedside. Sve done pt is complete and +2  
Department of Obstetrics and Gynecology  Labor and Delivery   CNM Progress Note      SUBJECTIVE:  Pt was able to get some needed rest after nubain. Feeling contractions in her back.     OBJECTIVE:      Fetal heart rate:       Baseline Heart Rate:  130        Accelerations:  present       Long Term Variability:  moderate       Decelerations:  absent         Contraction frequency: 4 minutes    Membranes:  Ruptured copious amount of clear fluid    Cervix:         Dilation:  4 cm         Effacement:  70%         Station:  -2         Position:  posterior             ASSESSMENT & PLAN:    28 y.o.    OB History          1    Para   0    Term   0       0    AB   0    Living   0         SAB   0    IAB   0    Ectopic   0    Molar   0    Multiple   0    Live Births   0             40w3d  1. FWB: reassuring  2.  Continue current plan.   3. AROM with patient consent for clear fluid  4. Asymmetric position changes to help facilitate labor progress.   5. Discussed pitocin for labor augmentation, pt agreeable      Pham No, APRN - CNM   
Department of Obstetrics and Gynecology  Labor and Delivery  Attending Post Partum Progress Note      SUBJECTIVE:  PPD 2 s/p . Eating, drinking, voiding and ambulating without difficulty. Breastfeeding male infant, declines circ. Bleeding = menses. Pain controlled with medications. Denies c/o. Desires d/c home.     OBJECTIVE:      Vitals:  BP (!) 110/55   Pulse 61   Temp 97.9 °F (36.6 °C) (Oral)   Resp 16   Ht 1.6 m (5' 3\")   Wt 79.4 kg (175 lb)   SpO2 97%   Breastfeeding Unknown   BMI 31.00 kg/m²     ABDOMEN:  FF @ U/1  GENITAL/URINARY:  well-approx    DATA:    CBC:    Lab Results   Component Value Date/Time    WBC 14.2 2024 12:45 AM    RBC 4.77 2024 12:45 AM    HGB 14.3 2024 12:45 AM    HCT 42.3 2024 12:45 AM    MCV 88.7 2024 12:45 AM    RDW 13.4 2024 12:45 AM     2024 12:45 AM       ASSESSMENT & PLAN:    NL exam PPD 2 s/p . Meeting PP milestones.  Stable breastfeeding male infant. Declined circ.  D/ home today. RTO 6 wks or prn.   
Department of Obstetrics and Gynecology  Labor and Delivery  Attending Post Partum Progress Note      SUBJECTIVE:  Pt is happy and reports to be doing well. Reports breastfeeding is going well but still needs support. Ambulating and voiding without difficulty. Tolerating a normal diet. Normal lochia. Pain well controlled with current measures. Ok with discharge tomorrow.    OBJECTIVE:      Vitals:  /66   Pulse 71   Temp 97.8 °F (36.6 °C) (Oral)   Resp 16   Ht 1.6 m (5' 3\")   Wt 79.4 kg (175 lb)   SpO2 97%   Breastfeeding Unknown   BMI 31.00 kg/m²     ABDOMEN:  f/m/u-1  GENITAL/URINARY:  scant    DATA:    CBC:    Lab Results   Component Value Date/Time    WBC 14.2 2024 12:45 AM    RBC 4.77 2024 12:45 AM    HGB 14.3 2024 12:45 AM    HCT 42.3 2024 12:45 AM    MCV 88.7 2024 12:45 AM    RDW 13.4 2024 12:45 AM     2024 12:45 AM       ASSESSMENT & PLAN:      Principal Problem:      Plan: Continue routine cares     Lactating mother  Plan: Gave anticipatory guidance on BF    PPD#1, meeting postpartum milestones, uneventful postpartum course, breastfeeding with assistance. Anticipate discharge tomorrow on PPD#2.    ELYSIA Shen - VIVI   
Pt comfortable w/ epidural  VSS, afebrile  FHT: 120, mod variability, no accels, recurrent lates earlier this AM, variable decels  TOCO: q1-4min  VE: 6/80/-1  Pit @ 2mu  Late decels have currently resolved w/ position change  Will continue to monitor closely  FHR currently Cat 1  Will update Dr. Castellanos of status  
Pt comfortable w/ epidural. Denies feeling rectal pressure.  VSS, afebrile  FHT: 120, mod variability, +accels, occ late and variable decels  TOCO: q1.5-4min  VE: 10/100/+2  Pit @ 2mu  Plan to start pushing in about 1 hr unless pt begins to feel rectal pressure sooner  FHT Cat 1/2  Anticipate vaginal delivery  Dr. Castellanos updated on pt status  
Pt delivered infant male at this time via vaginal delivery  
Pt started pushing at this time. Room set up for delivery. This RN and DALE Fitzgerald CNM remain at pt bedside while pushing. FHR obtained via EFM.  
RN reviewed post birth warning signs with pt and fob  
Report given to LAURA Grant RN  
Report received from JOANNE Weeks RN. Bedside report given. Introduced myself to pt as her RN for the day. I put my name and phone number on the white board and showed pt how to use her room phone to get a hold of me. Pt was given her plan of care for the day. Call light within reach. Bed in lowest position and wheels are locked. Pt verbalized understanding and denies any further needs at this time.Continue to monitor.  
SCOTT Muñoz CRNA at bedside for epidural placement.  
their discharge booklet they can take.  13. Do you have any other questions or concerns I can address today? Y/N  ______________      _________________________________________________________________________    Information provided during call :_________________________________________________  ___________________________________________________________________________    Call completed by:____________________________    Date:_________ Time:___________

## 2024-06-12 NOTE — PROGRESS NOTES
[facility]  DIVISION OF OTOLARYNGOLOGY- HEAD & NECK SURGERY  CONSULT    Patient Name: Roberta Santos  Medical Record Number:  [id number]  Primary Care Physician:  Unknown, Provider, APRN - NP  Date of Consultation: 6/17/2024    Chief Complaint:   Chief Complaint   Patient presents with    New Patient    Ear Problem     Bilateral recurrent ear infection, right ear x 10 days.       HISTORY OF PRESENT ILLNESS  Roberta is a(n) 28 y.o. female who presents for evaluation of bilateral ear problems.  She states that she has had 3-4 ear infections in the last several months.  She does not know if they were middle ear or external ear infections.  She has been treated with both drops and pills.  She states that the biggest complaint is pain.  She is also having some decreased hearing at this point in time  Patient Active Problem List   Diagnosis   (none) - all problems resolved or deleted     Past Surgical History:   Procedure Laterality Date    WISDOM TOOTH EXTRACTION       No family history on file.  Social History     Socioeconomic History    Marital status: Unknown     Spouse name: Not on file    Number of children: Not on file    Years of education: Not on file    Highest education level: Not on file   Occupational History    Not on file   Tobacco Use    Smoking status: Never    Smokeless tobacco: Never   Vaping Use    Vaping Use: Never used   Substance and Sexual Activity    Alcohol use: Never    Drug use: Never    Sexual activity: Yes     Partners: Male   Other Topics Concern    Not on file   Social History Narrative    Not on file     Social Determinants of Health     Financial Resource Strain: Not on file   Food Insecurity: No Food Insecurity (2/5/2024)    Hunger Vital Sign     Worried About Running Out of Food in the Last Year: Never true     Ran Out of Food in the Last Year: Never true   Transportation Needs: No Transportation Needs (2/5/2024)    PRAPARE - Transportation     Lack of Transportation (Medical): No

## 2024-06-17 ENCOUNTER — PROCEDURE VISIT (OUTPATIENT)
Dept: AUDIOLOGY | Age: 29
End: 2024-06-17
Payer: COMMERCIAL

## 2024-06-17 ENCOUNTER — OFFICE VISIT (OUTPATIENT)
Dept: ENT CLINIC | Age: 29
End: 2024-06-17
Payer: COMMERCIAL

## 2024-06-17 VITALS
DIASTOLIC BLOOD PRESSURE: 53 MMHG | HEART RATE: 60 BPM | SYSTOLIC BLOOD PRESSURE: 98 MMHG | WEIGHT: 161 LBS | HEIGHT: 63 IN | BODY MASS INDEX: 28.53 KG/M2

## 2024-06-17 DIAGNOSIS — H61.23 BILATERAL IMPACTED CERUMEN: ICD-10-CM

## 2024-06-17 DIAGNOSIS — H90.11 CONDUCTIVE HEARING LOSS OF RIGHT EAR WITH UNRESTRICTED HEARING OF LEFT EAR: Primary | ICD-10-CM

## 2024-06-17 PROCEDURE — 92567 TYMPANOMETRY: CPT | Performed by: AUDIOLOGIST

## 2024-06-17 PROCEDURE — 92557 COMPREHENSIVE HEARING TEST: CPT | Performed by: AUDIOLOGIST

## 2024-06-17 PROCEDURE — 99204 OFFICE O/P NEW MOD 45 MIN: CPT | Performed by: STUDENT IN AN ORGANIZED HEALTH CARE EDUCATION/TRAINING PROGRAM

## 2024-06-17 PROCEDURE — 69210 REMOVE IMPACTED EAR WAX UNI: CPT | Performed by: STUDENT IN AN ORGANIZED HEALTH CARE EDUCATION/TRAINING PROGRAM

## 2024-06-17 RX ORDER — METHYLPREDNISOLONE 4 MG/1
TABLET ORAL
Qty: 1 KIT | Refills: 0 | Status: SHIPPED | OUTPATIENT
Start: 2024-06-17 | End: 2024-06-23

## 2024-06-17 ASSESSMENT — ENCOUNTER SYMPTOMS
SHORTNESS OF BREATH: 0
NAUSEA: 0
VOMITING: 0
EYE PAIN: 0
RHINORRHEA: 0
COUGH: 0

## 2024-06-17 NOTE — PROGRESS NOTES
Roberta Santos   1995, 28 y.o. female   [id number]       Referring Provider: Travis Myers DO   Referral Type: In an order in Epic    Reason for Visit: Evaluation of the cause of disorders of hearing, tinnitus, or balance.    ADULT AUDIOLOGIC EVALUATION      Roberta Santos is a 28 y.o. female seen today, 6/17/2024 , for an initial audiologic evaluation.  Patient was seen by Travis Myers DO  following today's evaluation.    AUDIOLOGIC AND OTHER PERTINENT MEDICAL HISTORY:      Roberta Santos reports several ear infections within the last year. She reports being diagnosed with an ear infection in her right ear last week by an urgent care. She was put on antibiotics at that time, but states the right ear is still bothering her. No other significant otologic history reported.     She denied otorrhea, tinnitus, history of falls, history of significant noise exposure, history of head trauma, and history of ear surgery.    Date: 6/17/2024     IMPRESSIONS:      Today's results revealed a mild high frequency conductive hearing loss in the right ear. Excellent speech understanding when in quiet. Tympanometry indicates normal middle ear function. Discussed test results and implications with patient.  Follow medical recommendations of Travis Myers DO.    ASSESSMENT AND FINDINGS:     Otoscopy unremarkable.    RIGHT EAR:  Hearing Sensitivity: Hearing within normal limits sloping to a mild high frequency conductive hearing loss. Note the conductive component at 500Hz and 4kHz.  Speech Recognition Threshold: 5 dB HL  Word Recognition: Excellent 100%, based on NU-6 25-word list at 50 dBHL using recorded speech stimuli.    Tympanometry: Normal peak pressure and compliance, Type A tympanogram, consistent with normal middle ear function. Volume 0.6 cm3, Peak 1 daPa, 0.28 mmho.      LEFT EAR:  Hearing Sensitivity:Hearing sensitivity within normal limits from 250-8000 Hz  Speech Recognition Threshold: 0 dB HL  Word Recognition:

## 2024-07-09 ENCOUNTER — OFFICE VISIT (OUTPATIENT)
Dept: ENT CLINIC | Age: 29
End: 2024-07-09
Payer: COMMERCIAL

## 2024-07-09 ENCOUNTER — TELEPHONE (OUTPATIENT)
Dept: ENT CLINIC | Age: 29
End: 2024-07-09

## 2024-07-09 VITALS
WEIGHT: 161 LBS | HEART RATE: 73 BPM | HEIGHT: 63 IN | SYSTOLIC BLOOD PRESSURE: 125 MMHG | BODY MASS INDEX: 28.53 KG/M2 | DIASTOLIC BLOOD PRESSURE: 67 MMHG

## 2024-07-09 DIAGNOSIS — H65.02 NON-RECURRENT ACUTE SEROUS OTITIS MEDIA OF LEFT EAR: ICD-10-CM

## 2024-07-09 DIAGNOSIS — R59.1 LYMPHADENOPATHY: ICD-10-CM

## 2024-07-09 DIAGNOSIS — H61.23 BILATERAL IMPACTED CERUMEN: Primary | ICD-10-CM

## 2024-07-09 PROCEDURE — 99214 OFFICE O/P EST MOD 30 MIN: CPT | Performed by: STUDENT IN AN ORGANIZED HEALTH CARE EDUCATION/TRAINING PROGRAM

## 2024-07-09 PROCEDURE — 69210 REMOVE IMPACTED EAR WAX UNI: CPT | Performed by: STUDENT IN AN ORGANIZED HEALTH CARE EDUCATION/TRAINING PROGRAM

## 2024-07-09 RX ORDER — LEVOFLOXACIN 500 MG/1
500 TABLET, FILM COATED ORAL DAILY
Qty: 7 TABLET | Refills: 0 | Status: SHIPPED | OUTPATIENT
Start: 2024-07-09 | End: 2024-07-16

## 2024-07-09 ASSESSMENT — ENCOUNTER SYMPTOMS
NAUSEA: 0
SHORTNESS OF BREATH: 0
RHINORRHEA: 0
EYE PAIN: 0
VOMITING: 0
COUGH: 0

## 2024-07-09 NOTE — PROGRESS NOTES
[facility]  DIVISION OF OTOLARYNGOLOGY- HEAD & NECK SURGERY  CONSULT    Patient Name: Roberta Santos  Medical Record Number:  [id number]  Primary Care Physician:  Unknown, Provider, APRN - NP  Date of Consultation: 7/9/2024    Chief Complaint:   Chief Complaint   Patient presents with    Ear Problem     Left ear pain, gland swollen right side.        HISTORY OF PRESENT ILLNESS  Roberta is a(n) 28 y.o. female who presents for evaluation of bilateral ear problems.  She states that she has had 3-4 ear infections in the last several months.  She does not know if they were middle ear or external ear infections.  She has been treated with both drops and pills.  She states that the biggest complaint is pain.  She is also having some decreased hearing at this point in time    Interval History 07/09/24  Julieth is having some left-sided ear pain and has a lymph node that has been swollen on the right behind the ear as well.    Patient Active Problem List   Diagnosis   (none) - all problems resolved or deleted     Past Surgical History:   Procedure Laterality Date    WISDOM TOOTH EXTRACTION       No family history on file.  Social History     Socioeconomic History    Marital status: Unknown     Spouse name: Not on file    Number of children: Not on file    Years of education: Not on file    Highest education level: Not on file   Occupational History    Not on file   Tobacco Use    Smoking status: Never    Smokeless tobacco: Never   Vaping Use    Vaping Use: Never used   Substance and Sexual Activity    Alcohol use: Never    Drug use: Never    Sexual activity: Yes     Partners: Male   Other Topics Concern    Not on file   Social History Narrative    Not on file     Social Determinants of Health     Financial Resource Strain: Not on file   Food Insecurity: No Food Insecurity (2/5/2024)    Hunger Vital Sign     Worried About Running Out of Food in the Last Year: Never true     Ran Out of Food in the Last Year: Never true

## 2024-07-09 NOTE — TELEPHONE ENCOUNTER
Patient called to tell  she feels she has another ear infection.  Patient asking to be seen in office today because she leaves to go out of town tomorrow.  Ok to be seen today?    Patient phone number [phone]

## 2024-08-05 ENCOUNTER — OFFICE VISIT (OUTPATIENT)
Dept: ENT CLINIC | Age: 29
End: 2024-08-05
Payer: COMMERCIAL

## 2024-08-05 VITALS
HEIGHT: 63 IN | HEART RATE: 75 BPM | SYSTOLIC BLOOD PRESSURE: 113 MMHG | WEIGHT: 161 LBS | BODY MASS INDEX: 28.53 KG/M2 | DIASTOLIC BLOOD PRESSURE: 64 MMHG

## 2024-08-05 DIAGNOSIS — H61.23 BILATERAL IMPACTED CERUMEN: ICD-10-CM

## 2024-08-05 DIAGNOSIS — R59.1 LYMPHADENOPATHY: Primary | ICD-10-CM

## 2024-08-05 PROCEDURE — 10021 FNA BX W/O IMG GDN 1ST LES: CPT | Performed by: STUDENT IN AN ORGANIZED HEALTH CARE EDUCATION/TRAINING PROGRAM

## 2024-08-05 PROCEDURE — 99213 OFFICE O/P EST LOW 20 MIN: CPT | Performed by: STUDENT IN AN ORGANIZED HEALTH CARE EDUCATION/TRAINING PROGRAM

## 2024-08-05 RX ORDER — LIDOCAINE HYDROCHLORIDE AND EPINEPHRINE 10; 10 MG/ML; UG/ML
3 INJECTION, SOLUTION INFILTRATION; PERINEURAL ONCE
Status: COMPLETED | OUTPATIENT
Start: 2024-08-05 | End: 2024-08-05

## 2024-08-05 RX ADMIN — LIDOCAINE HYDROCHLORIDE AND EPINEPHRINE 3 ML: 10; 10 INJECTION, SOLUTION INFILTRATION; PERINEURAL at 10:18

## 2024-08-05 ASSESSMENT — ENCOUNTER SYMPTOMS
NAUSEA: 0
SHORTNESS OF BREATH: 0
EYE PAIN: 0
RHINORRHEA: 0
VOMITING: 0
COUGH: 0

## 2024-08-05 NOTE — PROGRESS NOTES
[facility]  DIVISION OF OTOLARYNGOLOGY- HEAD & NECK SURGERY  CONSULT    Patient Name: Roberta Santos  Medical Record Number:  [id number]  Primary Care Physician:  Unknown, Provider, APRN - NP  Date of Consultation: 8/5/2024    Chief Complaint:   Chief Complaint   Patient presents with    Ear Problem     Otitis media left ear follow up getting better with less pain. No dizziness.      HISTORY OF PRESENT ILLNESS  Roberta is a(n) 28 y.o. female who presents for evaluation of bilateral ear problems.  She states that she has had 3-4 ear infections in the last several months.  She does not know if they were middle ear or external ear infections.  She has been treated with both drops and pills.  She states that the biggest complaint is pain.  She is also having some decreased hearing at this point in time    Interval History 7/9/2024  Roberta is having some left-sided ear pain and has a lymph node that has been swollen on the right behind the ear as well.    Interval History 08/05/24  Roberta states that the ear pain is better however she still has a lymph node that is swollen postauricular right side    Patient Active Problem List   Diagnosis   (none) - all problems resolved or deleted     Past Surgical History:   Procedure Laterality Date    WISDOM TOOTH EXTRACTION       History reviewed. No pertinent family history.  Social History     Socioeconomic History    Marital status: Unknown     Spouse name: Not on file    Number of children: Not on file    Years of education: Not on file    Highest education level: Not on file   Occupational History    Not on file   Tobacco Use    Smoking status: Never    Smokeless tobacco: Never   Vaping Use    Vaping Use: Never used   Substance and Sexual Activity    Alcohol use: Never    Drug use: Never    Sexual activity: Yes     Partners: Male   Other Topics Concern    Not on file   Social History Narrative    Not on file     Social Determinants of Health     Financial Resource Strain:

## 2024-08-07 NOTE — RESULT ENCOUNTER NOTE
Please let Roberta know the biopsy did show lymphocytes that reflect that it is a lymph node. They suggest further sampling of the nodule which can be removed and sent for evaluation in the office. Please have a follow up appointment scheduled.

## 2024-08-08 ENCOUNTER — OFFICE VISIT (OUTPATIENT)
Dept: ENT CLINIC | Age: 29
End: 2024-08-08

## 2024-08-08 VITALS
DIASTOLIC BLOOD PRESSURE: 70 MMHG | WEIGHT: 161 LBS | BODY MASS INDEX: 28.53 KG/M2 | HEIGHT: 63 IN | HEART RATE: 90 BPM | RESPIRATION RATE: 16 BRPM | SYSTOLIC BLOOD PRESSURE: 108 MMHG

## 2024-08-08 DIAGNOSIS — R59.1 LYMPHADENOPATHY: Primary | ICD-10-CM

## 2024-08-08 RX ORDER — LIDOCAINE HYDROCHLORIDE AND EPINEPHRINE 10; 10 MG/ML; UG/ML
20 INJECTION, SOLUTION INFILTRATION; PERINEURAL ONCE
Status: COMPLETED | OUTPATIENT
Start: 2024-08-08 | End: 2024-08-09

## 2024-08-08 ASSESSMENT — ENCOUNTER SYMPTOMS
VOMITING: 0
SHORTNESS OF BREATH: 0
NAUSEA: 0
COUGH: 0
RHINORRHEA: 0
EYE PAIN: 0

## 2024-08-08 NOTE — PROGRESS NOTES
[facility]  DIVISION OF OTOLARYNGOLOGY- HEAD & NECK SURGERY  CONSULT    Patient Name: Roberta Santos  Medical Record Number:  [id number]  Primary Care Physician:  Unknown, Provider, APRN - NP  Date of Consultation: 8/8/2024    Chief Complaint:   Chief Complaint   Patient presents with    Other     Patient is being seen for results and for biopsy.       HISTORY OF PRESENT ILLNESS  Roberta is a(n) 28 y.o. female who presents for evaluation of bilateral ear problems.  She states that she has had 3-4 ear infections in the last several months.  She does not know if they were middle ear or external ear infections.  She has been treated with both drops and pills.  She states that the biggest complaint is pain.  She is also having some decreased hearing at this point in time    Interval History 7/9/2024  Roberta is having some left-sided ear pain and has a lymph node that has been swollen on the right behind the ear as well.    Interval History 08/05/24  Roberta states that the ear pain is better however she still has a lymph node that is swollen postauricular right side    Interval History 08/08/24  Pathology came back as multiple lymphocytes and they recommend excision for more information      Patient Active Problem List   Diagnosis   (none) - all problems resolved or deleted     Past Surgical History:   Procedure Laterality Date    WISDOM TOOTH EXTRACTION       No family history on file.  Social History     Socioeconomic History    Marital status: Unknown     Spouse name: Not on file    Number of children: Not on file    Years of education: Not on file    Highest education level: Not on file   Occupational History    Not on file   Tobacco Use    Smoking status: Never    Smokeless tobacco: Never   Vaping Use    Vaping Use: Never used   Substance and Sexual Activity    Alcohol use: Never    Drug use: Never    Sexual activity: Yes     Partners: Male   Other Topics Concern    Not on file   Social History Narrative    Not

## 2024-08-09 RX ADMIN — LIDOCAINE HYDROCHLORIDE AND EPINEPHRINE 4 ML: 10; 10 INJECTION, SOLUTION INFILTRATION; PERINEURAL at 07:39
